# Patient Record
Sex: MALE | Race: WHITE | NOT HISPANIC OR LATINO | Employment: UNEMPLOYED | URBAN - METROPOLITAN AREA
[De-identification: names, ages, dates, MRNs, and addresses within clinical notes are randomized per-mention and may not be internally consistent; named-entity substitution may affect disease eponyms.]

---

## 2022-01-07 ENCOUNTER — TELEPHONE (OUTPATIENT)
Dept: FAMILY MEDICINE | Facility: CLINIC | Age: 32
End: 2022-01-07
Payer: COMMERCIAL

## 2022-01-10 NOTE — TELEPHONE ENCOUNTER
Future Appointments       Date Provider Specialty Appt Notes    1/18/2022 Kahlil Velázquez MD Family Medicine [facility]

## 2022-01-18 ENCOUNTER — TELEPHONE (OUTPATIENT)
Dept: FAMILY MEDICINE | Facility: CLINIC | Age: 32
End: 2022-01-18
Payer: COMMERCIAL

## 2022-01-18 ENCOUNTER — OFFICE VISIT (OUTPATIENT)
Dept: FAMILY MEDICINE | Facility: CLINIC | Age: 32
End: 2022-01-18
Payer: COMMERCIAL

## 2022-01-18 VITALS
OXYGEN SATURATION: 98 % | WEIGHT: 315 LBS | TEMPERATURE: 99 F | HEART RATE: 84 BPM | BODY MASS INDEX: 38.36 KG/M2 | DIASTOLIC BLOOD PRESSURE: 78 MMHG | HEIGHT: 76 IN | SYSTOLIC BLOOD PRESSURE: 112 MMHG

## 2022-01-18 DIAGNOSIS — Z00.00 WELL ADULT EXAM: Primary | ICD-10-CM

## 2022-01-18 DIAGNOSIS — E66.2 CLASS 2 OBESITY WITH ALVEOLAR HYPOVENTILATION, SERIOUS COMORBIDITY, AND BODY MASS INDEX (BMI) OF 39.0 TO 39.9 IN ADULT: ICD-10-CM

## 2022-01-18 DIAGNOSIS — M51.36 DDD (DEGENERATIVE DISC DISEASE), LUMBAR: ICD-10-CM

## 2022-01-18 DIAGNOSIS — Z12.5 ENCOUNTER FOR PROSTATE CANCER SCREENING: ICD-10-CM

## 2022-01-18 DIAGNOSIS — Z79.899 ENCOUNTER FOR LONG-TERM (CURRENT) USE OF MEDICATIONS: ICD-10-CM

## 2022-01-18 DIAGNOSIS — Z13.6 SCREENING FOR CARDIOVASCULAR CONDITION: ICD-10-CM

## 2022-01-18 DIAGNOSIS — Z13.220 ENCOUNTER FOR LIPID SCREENING FOR CARDIOVASCULAR DISEASE: ICD-10-CM

## 2022-01-18 DIAGNOSIS — Z13.6 ENCOUNTER FOR LIPID SCREENING FOR CARDIOVASCULAR DISEASE: ICD-10-CM

## 2022-01-18 PROBLEM — E66.812 CLASS 2 OBESITY WITH ALVEOLAR HYPOVENTILATION, SERIOUS COMORBIDITY, AND BODY MASS INDEX (BMI) OF 39.0 TO 39.9 IN ADULT: Status: ACTIVE | Noted: 2022-01-18

## 2022-01-18 PROBLEM — M51.369 DDD (DEGENERATIVE DISC DISEASE), LUMBAR: Status: ACTIVE | Noted: 2022-01-18

## 2022-01-18 PROCEDURE — 99999 PR PBB SHADOW E&M-EST. PATIENT-LVL IV: ICD-10-PCS | Mod: PBBFAC,,, | Performed by: FAMILY MEDICINE

## 2022-01-18 PROCEDURE — 3078F PR MOST RECENT DIASTOLIC BLOOD PRESSURE < 80 MM HG: ICD-10-PCS | Mod: CPTII,S$GLB,, | Performed by: FAMILY MEDICINE

## 2022-01-18 PROCEDURE — 1160F RVW MEDS BY RX/DR IN RCRD: CPT | Mod: CPTII,S$GLB,, | Performed by: FAMILY MEDICINE

## 2022-01-18 PROCEDURE — 3008F BODY MASS INDEX DOCD: CPT | Mod: CPTII,S$GLB,, | Performed by: FAMILY MEDICINE

## 2022-01-18 PROCEDURE — 3078F DIAST BP <80 MM HG: CPT | Mod: CPTII,S$GLB,, | Performed by: FAMILY MEDICINE

## 2022-01-18 PROCEDURE — 93005 EKG 12-LEAD: ICD-10-PCS | Mod: S$GLB,,, | Performed by: FAMILY MEDICINE

## 2022-01-18 PROCEDURE — 93005 ELECTROCARDIOGRAM TRACING: CPT | Mod: S$GLB,,, | Performed by: FAMILY MEDICINE

## 2022-01-18 PROCEDURE — 3074F SYST BP LT 130 MM HG: CPT | Mod: CPTII,S$GLB,, | Performed by: FAMILY MEDICINE

## 2022-01-18 PROCEDURE — 99999 PR PBB SHADOW E&M-EST. PATIENT-LVL IV: CPT | Mod: PBBFAC,,, | Performed by: FAMILY MEDICINE

## 2022-01-18 PROCEDURE — 93010 EKG 12-LEAD: ICD-10-PCS | Mod: S$GLB,,, | Performed by: INTERNAL MEDICINE

## 2022-01-18 PROCEDURE — 93010 ELECTROCARDIOGRAM REPORT: CPT | Mod: S$GLB,,, | Performed by: INTERNAL MEDICINE

## 2022-01-18 PROCEDURE — 1160F PR REVIEW ALL MEDS BY PRESCRIBER/CLIN PHARMACIST DOCUMENTED: ICD-10-PCS | Mod: CPTII,S$GLB,, | Performed by: FAMILY MEDICINE

## 2022-01-18 PROCEDURE — 1159F PR MEDICATION LIST DOCUMENTED IN MEDICAL RECORD: ICD-10-PCS | Mod: CPTII,S$GLB,, | Performed by: FAMILY MEDICINE

## 2022-01-18 PROCEDURE — 99385 PREV VISIT NEW AGE 18-39: CPT | Mod: S$GLB,,, | Performed by: FAMILY MEDICINE

## 2022-01-18 PROCEDURE — 99385 PR PREVENTIVE VISIT,NEW,18-39: ICD-10-PCS | Mod: S$GLB,,, | Performed by: FAMILY MEDICINE

## 2022-01-18 PROCEDURE — 3008F PR BODY MASS INDEX (BMI) DOCUMENTED: ICD-10-PCS | Mod: CPTII,S$GLB,, | Performed by: FAMILY MEDICINE

## 2022-01-18 PROCEDURE — 3074F PR MOST RECENT SYSTOLIC BLOOD PRESSURE < 130 MM HG: ICD-10-PCS | Mod: CPTII,S$GLB,, | Performed by: FAMILY MEDICINE

## 2022-01-18 PROCEDURE — 1159F MED LIST DOCD IN RCRD: CPT | Mod: CPTII,S$GLB,, | Performed by: FAMILY MEDICINE

## 2022-01-18 RX ORDER — TRAMADOL HYDROCHLORIDE 50 MG/1
50 TABLET ORAL EVERY 8 HOURS PRN
COMMUNITY
Start: 2022-01-05

## 2022-01-18 RX ORDER — BACLOFEN 10 MG/1
10 TABLET ORAL 3 TIMES DAILY
COMMUNITY
Start: 2022-01-05

## 2022-01-18 RX ORDER — MELOXICAM 7.5 MG/1
7.5 TABLET ORAL DAILY
COMMUNITY
Start: 2022-01-05

## 2022-01-18 NOTE — PATIENT INSTRUCTIONS
"Follow up in about 1 year (around 1/18/2023), or if symptoms worsen or fail to improve, for Annual Wellness Exam.     Patient Education       Prostate Cancer Screening (PSA Tests)   The Basics   Written by the doctors and editors at [facility]   What is prostate cancer screening? -- Prostate cancer screening is a way in which doctors check the prostate gland for signs of cancer. The prostate gland is part of the male anatomy. It sits below the bladder and in front of the rectum. It forms a ring around the urethra, the tube that carries urine out of the body (figure 1).  The main test used to screen for prostate cancer is a blood test called a "PSA test." Some people also have an exam called a rectal exam (figure 2).   Who should be screened for prostate cancer? -- Prostate cancer screening is done in people who have no symptoms of the disease. It is not clear whether getting screened for prostate cancer can extend life or prevent symptoms or problems. For this reason, doctors do not know who - if anyone - should be screened for prostate cancer.  Most experts recommend working with your doctor to decide whether screening is right for you. In most cases, this discussion should start around the age of 50. If you have any risk factors for prostate cancer, you might want to begin screening at age 40 to 45. Your risk is higher if you are black, have a father or brother with prostate cancer, or have certain genetic mutations such as "BRCA1" or "BRCA2."  Most doctors recommend against screening if you are age 70 or older, or have serious health problems.  Why do doctors offer screening? -- Doctors offer screening in the hopes of catching prostate cancer early - before it has a chance to grow, spread, or cause symptoms. With many cancers, catching the disease early is an important part of effective treatment. But prostate cancer is not like many other cancers. It usually grows slowly and does not usually lead to death. The " "problem is that a small number of prostate cancers are serious and can lead to death. Doctors do not have an ideal way to tell which prostate cancers are deadly and which ones would never cause any problems.  Certain tests can suggest which prostate cancers might be more likely to cause problems. But the tests are far from perfect. Also, different studies have come to different conclusions about the benefits of screening and whether or not it lowers the risk of dying from prostate cancer.  What are the drawbacks to getting screened? -- PSA tests have 2 main drawbacks:  · PSA tests sometimes give "false positives." This means they suggest cancer when there is actually no cancer. This can lead to unneeded worry and to further tests. One of these tests, a biopsy, can be briefly painful.  · When PSA tests lead to the discovery of cancer, there is very often no way to tell whether the cancer is one that could do harm. That means you could get treated for cancer that would never have done you any harm. That's a problem because treatment for prostate cancer has risks and often causes problems of its own. For instance, prostate cancer treatment can cause you to leak urine and to have problems with sex.  How do I decide if I should be screened? -- Work with your doctor or nurse to decide if screening is right for you. This will involve thinking about how likely it is that you will get prostate cancer. If you have a high risk of prostate cancer, screening might be a good idea.  You will also need to think about how you feel about the possible benefits and harms of being screened. Ask yourself:  · Do I want to know if I have prostate cancer, even if the cancer might never do me any harm?  · Would I be treated if I learned that I had prostate cancer?  · How do I feel about the risks of being treated for prostate cancer?  · How do I feel about the risks of getting a deadly or aggressive form of prostate cancer?  · Would I be " "willing to accept a high risk of side effects from treatment in return for a small chance of living longer?  What is a PSA test? -- PSA stands for "prostate-specific antigen." PSA is a protein made by the prostate. Levels of this protein usually go up when a person has prostate cancer. The protein also goes up for reasons that do not involve cancer. For example, PSA levels rise when a man:  · Has a condition called benign prostatic hyperplasia (BPH), sometimes called an enlarged prostate  · Has a prostate infection, also called prostatitis  · Hurts his prostate, for example while riding a bike  · Ejaculates (has an orgasm)  What if my PSA level is too high? -- If your PSA level is high, try not to panic. It's possible your PSA is high for reasons unrelated to cancer. If your PSA level is only somewhat high, often the next step is to have the PSA test again. For 2 days before the second test, avoid ejaculating and bike-riding. If your doctor thinks you have a prostate infection, you might also need to take antibiotics for a while before you repeat the test.  If your PSA is still high on the second test, or if it was very high the first time, you will probably need more testing. This might include a biopsy or other test. A biopsy means that a doctor will insert a needle into your prostate to take tiny samples of tissue. Those samples will then go to the lab to be checked for cancer.  If it turns out you do have cancer, remember that prostate cancer is not usually deadly. It usually grows slowly, so you probably have time to decide what to do. There are treatments that can sometimes cure prostate cancer. You might also choose to hold off on treatment and wait to see if your cancer shows signs of progressing.  How often should I be screened for prostate cancer? -- If you do decide to be screened, experts recommend repeating screening every 2 to 4 years.  Doctors recommend stopping screening when you turn 70 or if you " develop serious health problems. In these cases, the benefits of screening are not worth the possible harms.  All topics are updated as new evidence becomes available and our peer review process is complete.  This topic retrieved from [employer] on: Sep 21, 2021.  Topic 51467 Version 13.0  Release: 29.4.2 - C29.263  © 2021 UpToDate, Inc. and/or its affiliates. All rights reserved.  figure 1: Prostate gland     This drawing shows male internal organs and a close-up of the prostate gland.  Graphic 32711 Version 5.0    figure 2: Rectal exam     During a rectal exam, the doctor or nurse puts a finger inside your rectum and feels your prostate gland. That way he or she can see how big it is and whether it has bumps or dents or anything unusual.  Graphic 55880 Version 5.0    Consumer Information Use and Disclaimer   This information is not specific medical advice and does not replace information you receive from your health care provider. This is only a brief summary of general information. It does NOT include all information about conditions, illnesses, injuries, tests, procedures, treatments, therapies, discharge instructions or life-style choices that may apply to you. You must talk with your health care provider for complete information about your health and treatment options. This information should not be used to decide whether or not to accept your health care provider's advice, instructions or recommendations. Only your health care provider has the knowledge and training to provide advice that is right for you. The use of this information is governed by the [employer] End User License Agreement, available at https://www.[employer]/en/solutions/trivago/about/moises.The use of [employer] content is governed by the [employer] Terms of Use. ©2021 UpToDate, Inc. All rights reserved.  Copyright   © 2021 UpToDate, Inc. and/or its affiliates. All rights reserved.      Dear patient,   As a result of recent federal  legislation (The Federal Cures Act), you may receive lab or pathology results from your visit in your [facility] account before your physician is able to contact you. Your physician or their representative will relay the results to you with their recommendations at their soonest availability.     If no improvement in symptoms or symptoms worsen, please be advised to call MD, follow-up at clinic and/or go to ER if becomes severe.    Kahlil Velázquez M.D.        We Offer TELEHEALTH & Same Day Appointments!   Book your Telehealth appointment with me through my nurse or   Clinic appointments on [facility]!    [address] [place] [address]    Office: [phone]   FAX: [phone]    Check out my Facebook Page and Follow Me at: [email or web address]    Check out my website at [employer] by clicking on: [email or web address]    To Schedule appointments online, go to [facility]: [email or web address]

## 2022-01-18 NOTE — TELEPHONE ENCOUNTER
----- Message from Dayana Truong sent at 1/18/2022 10:39 AM CST -----  Contact: [facility] Physical Therapist Mr. Ramirez @[facility] Mobile # [phone]  Mr. Ramirez patients Physical Therapist is calling in regards to him wanting to let you know that the patient have complain to him while he was in therapy this morning that he felt his pulse in his stomach while he was in therapy.     Mr. Neff is a new patient who's coming in to see you on this morning for eleven.

## 2022-01-18 NOTE — PROGRESS NOTES
This note is specifically for wellness visit performed today.   WELLNESS EXAM    Patient ID: Tomas Neff is a 31 y.o. male.  has a past medical history of DDD (degenerative disc disease), lumbar (1/18/2022).   Chief Complaint:  Encounter for wellness exam    Well Adult Physical: Patient here for a comprehensive physical exam.The patient reports chronic problems.  New patient.  Patient is here to establish primary care.  He is under the care of pain management/orthopedic spine at [facility].  Currently undergoing physical therapy for lumbar degenerative disc disease.  He is on anti-inflammatory muscle relaxer as needed.  Tramadol as needed for severe pain.  he reports compliance.  No side effects reported.  Symptoms are improved with treatment plan.The patient was checked in the [facility] Board of Pharmacy's  Prescription Monitoring Program. There appears to be no incongruities with the patient's verbalized history.     I received a message from his physical therapist today reporting that the patient was stating that he felt his pulse in his upper abdomen after certain maneuvers.  No abdominal bruit on exam.  Patient reports that he is no longer having any abnormal sensation.  No blood pressure problems in the past or currently.  Nonsmoker.    EKG today screening is within normal limits.    Updating labs today.    Do you take any herbs or supplements that were not prescribed by a doctor? no   Are you taking calcium supplements? no    History:  No issues  Date last PSA: No results found for: PSA The natural history of prostate cancer and ongoing controversy regarding screening and potential treatment outcomes of prostate cancer has been discussed with the patient. The meaning of a false positive PSA and a false negative PSA has been discussed. He indicates understanding of the limitations of this screening test and wishes  to proceed with screening PSA testing.  No results found for: TESTOSTERONE No results  found for: TESTOSTERONE, TOTALTESTOST, BIOTESTO   Colon cancer screening:  Not indicated  The patient has no Health Maintenance topics of status Not Due   ==============================================  History reviewed.   Health Maintenance Due   Topic Date Due    Hepatitis C Screening  Never done    Lipid Panel  Never done    HIV Screening  Never done    TETANUS VACCINE  08/05/2014    COVID-19 Vaccine (2 - Pfizer 3-dose series) 08/14/2021    Influenza Vaccine (1) Never done       Past Medical History:  Past Medical History:   Diagnosis Date    DDD (degenerative disc disease), lumbar 1/18/2022    Impression:   1. Right subarticular/foraminal zone extrusion at L4-5 effacing nerve roots in the right lateral recess.  2. Mild lumbar degenerative changes elsewhere, with mild bilateral foraminal narrowing spanning L4-S1.      Electronically signed by Lyndon Shay MD on 1/17/2022 11:43 AM      Past Surgical History:   Procedure Laterality Date    KNEE ARTHROSCOPY       Review of patient's allergies indicates:  No Known Allergies  Current Outpatient Medications on File Prior to Visit   Medication Sig Dispense Refill    baclofen (LIORESAL) 10 MG tablet Take 10 mg by mouth 3 (three) times daily.      meloxicam (MOBIC) 7.5 MG tablet Take 7.5 mg by mouth once daily.      traMADoL (ULTRAM) 50 mg tablet Take 50 mg by mouth every 8 (eight) hours as needed.       No current facility-administered medications on file prior to visit.     Social History     Socioeconomic History    Marital status:    Tobacco Use    Smoking status: Never Smoker    Smokeless tobacco: Never Used   Substance and Sexual Activity    Alcohol use: Not Currently     Alcohol/week: 0.0 standard drinks    Drug use: Never    Sexual activity: Yes     Partners: Female     Birth control/protection: None     Family History   Problem Relation Age of Onset    Coronary artery disease Maternal Grandfather     Breast cancer Paternal Grandmother      Diverticulosis Mother        Review of Systems   Constitutional: Negative for activity change and unexpected weight change.   HENT: Negative for hearing loss, rhinorrhea and trouble swallowing.    Eyes: Negative for discharge and visual disturbance.   Respiratory: Negative for chest tightness and wheezing.    Cardiovascular: Negative for chest pain and palpitations.   Gastrointestinal: Negative for blood in stool, constipation, diarrhea and vomiting.   Endocrine: Negative for polydipsia and polyuria.   Genitourinary: Negative for difficulty urinating, hematuria and urgency.   Musculoskeletal: Positive for back pain. Negative for arthralgias, joint swelling and neck pain.   Neurological: Negative for weakness and headaches.   Psychiatric/Behavioral: Negative for confusion and dysphoric mood.        Objective:     Vitals:    01/18/22 1136   BP: 112/78   Pulse: 84   Temp: 98.5 °F (36.9 °C)    Body mass index is 39.07 kg/m².  Physical Exam  Vitals and nursing note reviewed.   Constitutional:       General: He is not in acute distress.     Appearance: He is well-developed and well-nourished. He is not diaphoretic.   HENT:      Head: Normocephalic and atraumatic.      Right Ear: External ear normal.      Left Ear: External ear normal.      Nose: Nose normal. No rhinorrhea.   Eyes:      Extraocular Movements: Extraocular movements intact and EOM normal.      Pupils: Pupils are equal, round, and reactive to light.   Neck:      Vascular: No carotid bruit.   Cardiovascular:      Rate and Rhythm: Normal rate.      Pulses: Normal pulses.      Heart sounds: No murmur heard.      Pulmonary:      Effort: Pulmonary effort is normal. No respiratory distress.      Breath sounds: Normal breath sounds.   Abdominal:      General: Bowel sounds are normal. There is no abdominal bruit.      Palpations: Abdomen is soft.   Musculoskeletal:         General: Tenderness present. Normal range of motion.      Cervical back: Normal range of  motion and neck supple.   Skin:     General: Skin is warm and dry.      Capillary Refill: Capillary refill takes less than 2 seconds.      Findings: No rash.   Neurological:      General: No focal deficit present.      Mental Status: He is alert and oriented to person, place, and time.   Psychiatric:         Attention and Perception: He is attentive.         Mood and Affect: Mood and affect and mood normal. Mood is not anxious or depressed. Affect is not labile, blunt, angry or inappropriate.         Speech: He is communicative. Speech is not rapid and pressured, delayed, slurred or tangential.         Behavior: Behavior normal. Behavior is not agitated, slowed, aggressive, withdrawn, hyperactive, combative or actively hallucinating.         Thought Content: Thought content normal. Thought content is not paranoid or delusional. Thought content does not include homicidal or suicidal ideation. Thought content does not include homicidal or suicidal plan.         Cognition and Memory: Cognition and memory normal. Memory is not impaired.         Judgment: Judgment normal. Judgment is not impulsive or inappropriate.          No results found for any previous visit.        Assessment / Plan:    1.  Routine health exam-patient here for annual wellness exam.  Labs ordered.  Health maintenance was reviewed and ordered.  Anticipatory guidance: Don't smoke.  Healthy diet and regular exercise recommended. Vaccine recommendations discussed.  See orders.  Reviewed Anticipatory guidance, risk factor reduction interventions or counseling as needed, Complete history , physical was completed today.  Complete and thorough medication reconciliation was performed.  Discussed risks and benefits of medications.  Advised patient on orders and health maintenance.  We discussed old records and old labs if available.  Will request any records not available through epic.  Continue current medications listed on your summary sheet.  All questions  were answered. Patient had no further concerns. Advised of diagnoses and plan. Follow up as planned or return sooner if symptoms persist or worsen.   Lumbar degenerative disc disease:  Continue with physical therapy continue follow-up with pain management and continue current medication regimen.  Follow-up sooner if no improvement.  Reviewed imaging from [facility].    Orders Placed This Encounter   Procedures    CBC Without Differential     Standing Status:   Future     Number of Occurrences:   1     Standing Expiration Date:   3/19/2023    Comprehensive Metabolic Panel     Standing Status:   Future     Number of Occurrences:   1     Standing Expiration Date:   3/19/2023    TSH     Standing Status:   Future     Number of Occurrences:   1     Standing Expiration Date:   3/19/2023    Hemoglobin A1C     Standing Status:   Future     Number of Occurrences:   1     Standing Expiration Date:   3/19/2023    Lipid Panel     Standing Status:   Future     Number of Occurrences:   1     Standing Expiration Date:   3/19/2023    PSA, Screening     Standing Status:   Future     Number of Occurrences:   1     Standing Expiration Date:   3/19/2023    IN OFFICE EKG 12-LEAD (to [place])     Standing Status:   Future     Standing Expiration Date:   1/18/2023     Order Specific Question:   Diagnosis     Answer:   Encounter for screening for cardiovascular disorders [[id number]]           Future Appointments     Date Provider Specialty Appt Notes    1/18/2023 Kahlil Velázquez MD Family Medicine annual         Kahlil Velázquez MD

## 2022-05-31 ENCOUNTER — PATIENT MESSAGE (OUTPATIENT)
Dept: FAMILY MEDICINE | Facility: CLINIC | Age: 32
End: 2022-05-31
Payer: COMMERCIAL

## 2023-02-20 ENCOUNTER — TELEPHONE (OUTPATIENT)
Dept: FAMILY MEDICINE | Facility: CLINIC | Age: 33
End: 2023-02-20

## 2023-02-21 ENCOUNTER — TELEPHONE (OUTPATIENT)
Dept: FAMILY MEDICINE | Facility: CLINIC | Age: 33
End: 2023-02-21

## 2023-02-21 NOTE — TELEPHONE ENCOUNTER
----- Message from Parviz Andrews sent at 2/21/2023  9:25 AM CST -----  Contact: Julisa  Type:  Patient Returning Call    Who Called:Tomas   Who Left Message for Patient:Mabel  Does the patient know what this is regarding?:annual   Would the patient rather a call back or a response via [facility]? Call back   Best Call Back Number:[phone]  Additional Information:        Thanks  CF

## 2023-02-28 ENCOUNTER — OFFICE VISIT (OUTPATIENT)
Dept: FAMILY MEDICINE | Facility: CLINIC | Age: 33
End: 2023-02-28

## 2023-02-28 DIAGNOSIS — Z79.899 ENCOUNTER FOR LONG-TERM (CURRENT) USE OF MEDICATIONS: ICD-10-CM

## 2023-02-28 DIAGNOSIS — Z13.6 ENCOUNTER FOR LIPID SCREENING FOR CARDIOVASCULAR DISEASE: ICD-10-CM

## 2023-02-28 DIAGNOSIS — M54.9 UPPER BACK PAIN: ICD-10-CM

## 2023-02-28 DIAGNOSIS — E66.2 CLASS 2 OBESITY WITH ALVEOLAR HYPOVENTILATION, SERIOUS COMORBIDITY, AND BODY MASS INDEX (BMI) OF 39.0 TO 39.9 IN ADULT: ICD-10-CM

## 2023-02-28 DIAGNOSIS — M51.36 DDD (DEGENERATIVE DISC DISEASE), LUMBAR: ICD-10-CM

## 2023-02-28 DIAGNOSIS — Z13.220 ENCOUNTER FOR LIPID SCREENING FOR CARDIOVASCULAR DISEASE: ICD-10-CM

## 2023-02-28 DIAGNOSIS — Z00.00 WELL ADULT EXAM: Primary | ICD-10-CM

## 2023-02-28 PROCEDURE — 99214 OFFICE O/P EST MOD 30 MIN: CPT | Mod: PBBFAC,PO | Performed by: FAMILY MEDICINE

## 2023-02-28 PROCEDURE — 99395 PR PREVENTIVE VISIT,EST,18-39: ICD-10-PCS | Mod: S$PBB,,, | Performed by: FAMILY MEDICINE

## 2023-02-28 PROCEDURE — 99999 PR PBB SHADOW E&M-EST. PATIENT-LVL IV: CPT | Mod: PBBFAC,,, | Performed by: FAMILY MEDICINE

## 2023-02-28 PROCEDURE — 99395 PREV VISIT EST AGE 18-39: CPT | Mod: S$PBB,,, | Performed by: FAMILY MEDICINE

## 2023-02-28 PROCEDURE — 99999 PR PBB SHADOW E&M-EST. PATIENT-LVL IV: ICD-10-PCS | Mod: PBBFAC,,, | Performed by: FAMILY MEDICINE

## 2023-02-28 NOTE — Clinical Note
Please send patient's lab orders to [facility].  Notify him when this has been done.  Patient has [facility] insurance and would like to go there for labs.  Check with him to see if he needs anything further.

## 2023-03-01 VITALS
DIASTOLIC BLOOD PRESSURE: 68 MMHG | BODY MASS INDEX: 38.36 KG/M2 | HEART RATE: 70 BPM | HEIGHT: 76 IN | WEIGHT: 315 LBS | SYSTOLIC BLOOD PRESSURE: 124 MMHG | TEMPERATURE: 98 F | OXYGEN SATURATION: 98 % | RESPIRATION RATE: 16 BRPM

## 2023-03-02 ENCOUNTER — PATIENT MESSAGE (OUTPATIENT)
Dept: FAMILY MEDICINE | Facility: CLINIC | Age: 33
End: 2023-03-02

## 2023-03-02 PROBLEM — M54.9 UPPER BACK PAIN: Status: ACTIVE | Noted: 2023-03-02

## 2023-03-02 NOTE — PROGRESS NOTES
This note is specifically for wellness visit performed today.   WELLNESS EXAM    Patient ID: Tomas Neff is a 32 y.o. male.  has a past medical history of DDD (degenerative disc disease), lumbar (1/18/2022).   Chief Complaint:  Encounter for wellness exam    Well Adult Physical: Patient here for a comprehensive physical exam.The patient reports chronic problems.  The patient's last visit with me was on 1/18/2022.  Reviewed Care team:orthopedics: Jose Alberto Arana     March 2023:  Patient states that he feels fine except for upper neck and upper back pain.  Patient reports it is positional worse when sleeping at night.  No recent injury or trauma.    New patient.  Patient is here to establish primary care.  He is under the care of pain management/orthopedic spine at [facility].  Currently undergoing physical therapy for lumbar degenerative disc disease.  He is on anti-inflammatory muscle relaxer as needed.  Tramadol as needed for severe pain.  he reports compliance.  No side effects reported.  Symptoms are improved with treatment plan.The patient was checked in the [facility] [employer]'s  Prescription Monitoring Program. There appears to be no incongruities with the patient's verbalized history.     I received a message from his physical therapist today reporting that the patient was stating that he felt his pulse in his upper abdomen after certain maneuvers.  No abdominal bruit on exam.  Patient reports that he is no longer having any abnormal sensation.  No blood pressure problems in the past or currently.  Nonsmoker.    EKG today screening is within normal limits.    Updating labs today.    Lab Results   Component Value Date    CHOL 210 (H) 01/19/2022     Lab Results   Component Value Date    HDL 31 01/19/2022     Lab Results   Component Value Date    LDLCALC 154 (H) 01/19/2022     Lab Results   Component Value Date    TRIG 126 01/19/2022     Lab Results   Component Value Date    CHOLHDL 6.8 (H) 01/19/2022      CHRONIC. Stable. Compliant with medications for managed conditions. See medication list. No SE reported.   Routine lab analysis is being monitored. Refills were addressed.  No results found for: WBC, HGB, HCT, MCV, PLT      Chemistry    No results found for: NA, K, CL, CO2, BUN, CREATININE, GLU No results found for: CALCIUM, ALKPHOS, AST, ALT, BILITOT, ESTGFRAFRICA, EGFRNONAA       No results found for: TSH, Z2MTMIH, S3PAING, THYROIDAB, FREET4, T3FREE  Lab Results   Component Value Date    HGBA1C 5.6 01/19/2022         Do you take any herbs or supplements that were not prescribed by a doctor? no   Are you taking calcium supplements? no    History:  No issues  Date last PSA: No results found for: PSA no family history of prostate cancer reported.  The natural history of prostate cancer and ongoing controversy regarding screening and potential treatment outcomes of prostate cancer has been discussed with the patient. The meaning of a false positive PSA and a false negative PSA has been discussed. He indicates understanding of the limitations of this screening test and wishes  to proceed with screening PSA testing.  No results found for: TESTOSTERONE No results found for: TESTOSTERONE, TOTALTESTOST, BIOTESTO   Colon cancer screening:  Not indicated, no family history of colon cancer reported  The patient has no Health Maintenance topics of status Not Due   ==============================================  History reviewed.   Health Maintenance Due   Topic Date Due    Hepatitis C Screening  Never done    HIV Screening  Never done    TETANUS VACCINE  08/05/2014    COVID-19 Vaccine (2 - Pfizer series) 08/14/2021    Influenza Vaccine (1) Never done       Past Medical History:  Past Medical History:   Diagnosis Date    DDD (degenerative disc disease), lumbar 1/18/2022    Impression:   1. Right subarticular/foraminal zone extrusion at L4-5 effacing nerve roots in the right lateral recess.  2. Mild lumbar degenerative  changes elsewhere, with mild bilateral foraminal narrowing spanning L4-S1.      Electronically signed by Lyndon Shay MD on 1/17/2022 11:43 AM      Past Surgical History:   Procedure Laterality Date    KNEE ARTHROSCOPY       Review of patient's allergies indicates:  No Known Allergies  Current Outpatient Medications on File Prior to Visit   Medication Sig Dispense Refill    baclofen (LIORESAL) 10 MG tablet Take 10 mg by mouth 3 (three) times daily.      meloxicam (MOBIC) 7.5 MG tablet Take 7.5 mg by mouth once daily.      traMADoL (ULTRAM) 50 mg tablet Take 50 mg by mouth every 8 (eight) hours as needed.       No current facility-administered medications on file prior to visit.     Social History     Socioeconomic History    Marital status:    Tobacco Use    Smoking status: Never    Smokeless tobacco: Never   Substance and Sexual Activity    Alcohol use: Not Currently     Alcohol/week: 0.0 standard drinks    Drug use: Never    Sexual activity: Yes     Partners: Female     Birth control/protection: None     Family History   Problem Relation Age of Onset    Coronary artery disease Maternal Grandfather     Breast cancer Paternal Grandmother     Diverticulosis Mother        Review of Systems   Constitutional:  Negative for activity change and unexpected weight change.   HENT:  Negative for hearing loss, rhinorrhea and trouble swallowing.    Eyes:  Negative for discharge and visual disturbance.   Respiratory:  Negative for chest tightness and wheezing.    Cardiovascular:  Negative for chest pain and palpitations.   Gastrointestinal:  Negative for blood in stool, constipation, diarrhea and vomiting.   Endocrine: Negative for polydipsia and polyuria.   Genitourinary:  Negative for difficulty urinating, hematuria and urgency.   Musculoskeletal:  Positive for back pain and neck pain. Negative for arthralgias and joint swelling.   Neurological:  Negative for weakness and headaches.   Psychiatric/Behavioral:  Negative  for confusion and dysphoric mood.       Objective:     Vitals:    02/28/23 0757   BP: 124/68   Pulse: 70   Resp: 16   Temp: 98.2 °F (36.8 °C)    Body mass index is 39.09 kg/m².  Physical Exam  Vitals and nursing note reviewed.   Constitutional:       General: He is not in acute distress.     Appearance: He is well-developed. He is not diaphoretic.   HENT:      Head: Normocephalic and atraumatic.      Right Ear: External ear normal.      Left Ear: External ear normal.      Nose: Nose normal. No rhinorrhea.   Eyes:      Extraocular Movements: Extraocular movements intact.      Pupils: Pupils are equal, round, and reactive to light.   Neck:      Vascular: No carotid bruit.   Cardiovascular:      Rate and Rhythm: Normal rate.      Pulses: Normal pulses.      Heart sounds: No murmur heard.  Pulmonary:      Effort: Pulmonary effort is normal. No respiratory distress.      Breath sounds: Normal breath sounds.   Abdominal:      General: Bowel sounds are normal. There is no abdominal bruit.      Palpations: Abdomen is soft.   Musculoskeletal:         General: Tenderness present. Normal range of motion.      Cervical back: Normal range of motion and neck supple. Spasms and tenderness present. No bony tenderness or crepitus. No pain with movement.      Lumbar back: Spasms, tenderness and bony tenderness present. Negative right straight leg raise test and negative left straight leg raise test.   Skin:     General: Skin is warm and dry.      Capillary Refill: Capillary refill takes less than 2 seconds.      Findings: No rash.   Neurological:      General: No focal deficit present.      Mental Status: He is alert and oriented to person, place, and time.   Psychiatric:         Attention and Perception: He is attentive.         Mood and Affect: Mood normal. Mood is not anxious or depressed. Affect is not labile, blunt, angry or inappropriate.         Speech: He is communicative. Speech is not rapid and pressured, delayed, slurred  or tangential.         Behavior: Behavior normal. Behavior is not agitated, slowed, aggressive, withdrawn, hyperactive or combative.         Thought Content: Thought content normal. Thought content is not paranoid or delusional. Thought content does not include homicidal or suicidal ideation. Thought content does not include homicidal or suicidal plan.         Cognition and Memory: Memory is not impaired.         Judgment: Judgment normal. Judgment is not impulsive or inappropriate.        No results found for any previous visit.    REASON FOR EXAM: [M54.2]-Cervicalgia     TECHNICAL FACTORS: Two or three views     COMPARISON: None     FINDINGS: There is no evidence of acute fracture. There is no evidence of subluxation. Vertebral body heights and disc spaces are maintained. Prevertebral soft tissue is within normal limits. The cervical spine maintains a normal lordotic curvature.     IMPRESSION:   No evidence of acute osseous abnormality.     Electronically signed by Lisette Huntley MD on 3/3/2022 8:56 AM    Indication: [M54.16]-Radiculopathy, lumbar region / [M47.27]-Other spondylosis with radiculopathy, lumbosacral region / Low back pain, symptoms persist with > 6wks conservative treatment / Low back pain, progressive neurologic deficit     Technique: Multiplanar T1 and T2 weighted images were obtained through the lumbar spine without administration of intravenous contrast.     Comparison: None     Findings:   Lumbar vertebral body heights and alignment are maintained. There is straightening of the normal lordosis. There is no suspicious marrow signal. The conus appears normal.     T12-L1: There is disc desiccation and mild facet DJD. There is no spinal stenosis. There is no foraminal narrowing.     L1-L2: There is disc desiccation and diffuse disc bulge without spinal stenosis. There is mild facet DJD without foraminal narrowing.     L2-L3: There is no spinal stenosis or foraminal narrowing. There is mild  facet DJD.     L3-L4: There is disc desiccation and mild diffuse disc bulge without spinal stenosis. There is mild facet DJD without foraminal narrowing.     L4-L5: There is disc desiccation and diffuse disc bulge with a right subarticular/foraminal zone extrusion. This extends cranially along the posterior margin of the L4 vertebral body. This contacts traversing nerve roots in the right lateral recess.   There is no spinal stenosis. There is moderate facet DJD resulting in mild bilateral foraminal narrowing.     L5-S1: There is disc desiccation and diffuse disc bulge with no spinal stenosis. There is moderate facet DJD with minimal/mild bilateral foraminal narrowing.     Impression:     1. Right subarticular/foraminal zone extrusion at L4-5 effacing nerve roots in the right lateral recess.   2. Mild lumbar degenerative changes elsewhere, with mild bilateral foraminal narrowing spanning L4-S1.           Electronically signed by Lyndon Shay MD on 1/17/2022 11:43 AM    Indication: [M54.50]-Low back pain, unspecified     3 views     Comparison: None     Impression:   Lumbar vertebral body heights are maintained. There is mild discogenic degenerative change as well as moderate SI joint DJD. There is moderate facet DJD, most severe at L4-5 and L5-S1.     Electronically signed by Lyndon Shay MD on 1/5/2022 11:50 AM    Assessment / Plan:    1.  Routine health exam-patient here for annual wellness exam.  Labs ordered.  Health maintenance was reviewed and ordered.  Anticipatory guidance: Don't smoke.  Healthy diet and regular exercise recommended. Vaccine recommendations discussed.  See orders.  Reviewed Anticipatory guidance, risk factor reduction interventions or counseling as needed, Complete history , physical was completed today.  Complete and thorough medication reconciliation was performed.  Discussed risks and benefits of medications.  Advised patient on orders and health maintenance.  We discussed old records and  old labs if available.  Will request any records not available through epic.  Continue current medications listed on your summary sheet.  All questions were answered. Patient had no further concerns. Advised of diagnoses and plan. Follow up as planned or return sooner if symptoms persist or worsen.   Lumbar degenerative disc disease:  Continue with physical therapy continue follow-up with pain management and continue current medication regimen.  Follow-up sooner if no improvement.  Reviewed imaging from [facility].  Upper back pain/Neck spasm:  Referral to physical therapy.  Patient would benefit from dry needling.  Set up x-ray of the cervical spine if no improvement.  Discussed exercise and stretching for low back and upper back pain.  Orders Placed This Encounter   Procedures    CBC Without Differential     Standing Status:   Future     Standing Expiration Date:   4/30/2024    Comprehensive Metabolic Panel     Standing Status:   Future     Standing Expiration Date:   4/30/2024    TSH     Standing Status:   Future     Standing Expiration Date:   4/30/2024    Hemoglobin A1C     Standing Status:   Future     Standing Expiration Date:   4/30/2024    Lipid Panel     Standing Status:   Future     Standing Expiration Date:   4/30/2024    Urinalysis     Standing Status:   Future     Standing Expiration Date:   4/30/2024     Order Specific Question:   Collection Type     Answer:   Urine, Clean Catch                Kahlil Velázquez MD

## 2023-03-02 NOTE — PATIENT INSTRUCTIONS
Follow up if symptoms worsen or fail to improve, for Annual Wellness Exam.     Dear patient,   As a result of recent federal legislation (The Federal Cures Act), you may receive lab or pathology results from your visit in your [facility] account before your physician is able to contact you. Your physician or their representative will relay the results to you with their recommendations at their soonest availability.     If no improvement in symptoms or symptoms worsen, please be advised to call MD, follow-up at clinic and/or go to ER if becomes severe.    Kahlil Velázquez M.D.        We Offer TELEHEALTH & Same Day Appointments!   Book your Telehealth appointment with me through my nurse or   Clinic appointments on cookdinner!    [address] [place] [address]    Office: [phone]   FAX: [phone]    Check out my Facebook Page and Follow Me at: [email or web address]    Check out my website at [employer] by clicking on: [email or web address]    To Schedule appointments online, go to [employer]: [email or web address]

## 2023-03-04 ENCOUNTER — PATIENT MESSAGE (OUTPATIENT)
Dept: FAMILY MEDICINE | Facility: CLINIC | Age: 33
End: 2023-03-04

## 2023-04-05 ENCOUNTER — PATIENT MESSAGE (OUTPATIENT)
Dept: FAMILY MEDICINE | Facility: CLINIC | Age: 33
End: 2023-04-05

## 2023-04-06 NOTE — TELEPHONE ENCOUNTER
Cholesterol levels have improved from previous.  A1c has improved significantly from previous.  No diabetes.  Thyroid test is normal.  Metabolic panel shows normal kidney function liver function and electrolytes.  Blood counts are normal.  Urinalysis is within normal limits.  Increase hydration with water.

## 2024-05-18 ENCOUNTER — PATIENT MESSAGE (OUTPATIENT)
Dept: FAMILY MEDICINE | Facility: CLINIC | Age: 34
End: 2024-05-18

## 2024-06-05 ENCOUNTER — OFFICE VISIT (OUTPATIENT)
Dept: FAMILY MEDICINE | Facility: CLINIC | Age: 34
End: 2024-06-05

## 2024-06-05 VITALS
OXYGEN SATURATION: 98 % | HEART RATE: 74 BPM | WEIGHT: 315 LBS | DIASTOLIC BLOOD PRESSURE: 78 MMHG | SYSTOLIC BLOOD PRESSURE: 122 MMHG | HEIGHT: 75 IN | BODY MASS INDEX: 39.17 KG/M2

## 2024-06-05 DIAGNOSIS — E66.2 CLASS 3 OBESITY WITH ALVEOLAR HYPOVENTILATION, SERIOUS COMORBIDITY, AND BODY MASS INDEX (BMI) OF 40.0 TO 44.9 IN ADULT: ICD-10-CM

## 2024-06-05 DIAGNOSIS — Z00.00 WELL ADULT EXAM: Primary | ICD-10-CM

## 2024-06-05 DIAGNOSIS — Z13.6 ENCOUNTER FOR LIPID SCREENING FOR CARDIOVASCULAR DISEASE: ICD-10-CM

## 2024-06-05 DIAGNOSIS — M51.36 DDD (DEGENERATIVE DISC DISEASE), LUMBAR: ICD-10-CM

## 2024-06-05 DIAGNOSIS — Z13.220 ENCOUNTER FOR LIPID SCREENING FOR CARDIOVASCULAR DISEASE: ICD-10-CM

## 2024-06-05 DIAGNOSIS — Z79.899 ENCOUNTER FOR LONG-TERM (CURRENT) USE OF MEDICATIONS: ICD-10-CM

## 2024-06-05 PROBLEM — M51.369 DDD (DEGENERATIVE DISC DISEASE), LUMBAR: Chronic | Status: ACTIVE | Noted: 2022-01-18

## 2024-06-05 PROCEDURE — 99213 OFFICE O/P EST LOW 20 MIN: CPT | Mod: PBBFAC,PO | Performed by: FAMILY MEDICINE

## 2024-06-05 PROCEDURE — 99999 PR PBB SHADOW E&M-EST. PATIENT-LVL III: CPT | Mod: PBBFAC,,, | Performed by: FAMILY MEDICINE

## 2024-06-05 PROCEDURE — 99395 PREV VISIT EST AGE 18-39: CPT | Mod: S$PBB,,, | Performed by: FAMILY MEDICINE

## 2024-06-05 NOTE — PROGRESS NOTES
This note is specifically for wellness visit performed today.   WELLNESS EXAM    Patient ID: Tomas Neff is a 34 y.o. male.  has a past medical history of DDD (degenerative disc disease), lumbar (1/18/2022).   Chief Complaint:  Encounter for wellness exam    Well Adult Physical: Patient here for a comprehensive physical exam.The patient reports chronic problems.  The patient's last visit with me was on 2/28/2023.    June 2024:  History of Present Illness  The patient is a 34-year-old male here for follow-up annual exam.    The patient reports a slight weight gain since his last visit, however, no significant changes have been observed. He experiences intermittent back pain, for which he is under the care of Dr. Kevin Arana, an orthopedist PA. He is currently on a regimen of baclofen and meloxicam, which he does not take daily. Imaging revealed a herniated disc. He has previously undergone acupuncture and physical therapy, the latter of which he is uncertain of their efficacy. The pain originates from his neck and extends down his right leg. He speculates that his symptoms may be related to his past football participation. The initial incident of back pain occurred around the age of 22 or 23, with episodes occurring every few years. In 08/2021, he was in [place], during which he strained his back while working, which persisted for approximately 6 months. Subsequent to this, he experienced numbness in his leg, leading to a sensation of instability in his leg.       Reviewed Care team:orthopedics: Jose Alberto Arana     March 2023:  Patient states that he feels fine except for upper neck and upper back pain.  Patient reports it is positional worse when sleeping at night.  No recent injury or trauma.    New patient.  Patient is here to establish primary care.  He is under the care of pain management/orthopedic spine at [place].  Currently undergoing physical therapy for lumbar degenerative disc disease.  He is on  "anti-inflammatory muscle relaxer as needed.  Tramadol as needed for severe pain.  he reports compliance.  No side effects reported.  Symptoms are improved with treatment plan.The patient was checked in the [facility] [employer] [facility] [employer]'s  Prescription Monitoring Program. There appears to be no incongruities with the patient's verbalized history.     I received a message from his physical therapist today reporting that the patient was stating that he felt his pulse in his upper abdomen after certain maneuvers.  No abdominal bruit on exam.  Patient reports that he is no longer having any abnormal sensation.  No blood pressure problems in the past or currently.  Nonsmoker.    EKG today screening is within normal limits.    Updating labs today.    Lab Results   Component Value Date    CHOL 187 03/30/2023    CHOL 210 (H) 01/19/2022     Lab Results   Component Value Date    HDL 33 03/30/2023    HDL 31 01/19/2022     Lab Results   Component Value Date    LDLCALC 132 (H) 03/30/2023    LDLCALC 154 (H) 01/19/2022     Lab Results   Component Value Date    TRIG 109 03/30/2023    TRIG 126 01/19/2022     Lab Results   Component Value Date    CHOLHDL 5.7 (H) 03/30/2023    CHOLHDL 6.8 (H) 01/19/2022     CHRONIC. Stable. Compliant with medications for managed conditions. See medication list. No SE reported.   Routine lab analysis is being monitored. Refills were addressed.  No results found for: "WBC", "HGB", "HCT", "MCV", "PLT"      Chemistry        Component Value Date/Time     03/30/2023 0736    K 4.1 03/30/2023 0736    CO2 27 03/30/2023 0736    BUN 12 03/30/2023 0736    CREATININE 0.88 (L) 03/30/2023 0736        Component Value Date/Time    CALCIUM 9.4 03/30/2023 0736    ALKPHOS 53 03/30/2023 0736    AST 20 03/30/2023 0736    ALT 20 03/30/2023 0736    BILITOT 0.8 03/30/2023 0736          No results found for: "TSH", "M0HOXKN", "D8PTAFK", "THYROIDAB", "FREET4", "T3FREE"  Lab Results   Component Value Date    HGBA1C 5.0 " "03/30/2023         Do you take any herbs or supplements that were not prescribed by a doctor? no   Are you taking calcium supplements? no    History:  No issues  Date last PSA: No results found for: "PSA" no family history of prostate cancer reported.  The natural history of prostate cancer and ongoing controversy regarding screening and potential treatment outcomes of prostate cancer has been discussed with the patient. The meaning of a false positive PSA and a false negative PSA has been discussed. He indicates understanding of the limitations of this screening test and wishes  to proceed with screening PSA testing.  No results found for: "TESTOSTERONE" No results found for: "TESTOSTERONE", "TOTALTESTOST", "BIOTESTO"   Colon cancer screening:  Not indicated, no family history of colon cancer reported  Health Maintenance Topics with due status: Not Due       Topic Last Completion Date    Hemoglobin A1c (Diabetic Prevention Screening) 03/30/2023    Lipid Panel 03/30/2023    TETANUS VACCINE 10/26/2023    Influenza Vaccine Not Due      ==============================================  History reviewed.   There are no preventive care reminders to display for this patient.      Past Medical History:  Past Medical History:   Diagnosis Date    DDD (degenerative disc disease), lumbar 1/18/2022    Impression:   1. Right subarticular/foraminal zone extrusion at L4-5 effacing nerve roots in the right lateral recess.  2. Mild lumbar degenerative changes elsewhere, with mild bilateral foraminal narrowing spanning L4-S1.      Electronically signed by Lyndon Shay MD on 1/17/2022 11:43 AM      Past Surgical History:   Procedure Laterality Date    KNEE ARTHROSCOPY       Review of patient's allergies indicates:  No Known Allergies  Current Outpatient Medications on File Prior to Visit   Medication Sig Dispense Refill    baclofen (LIORESAL) 10 MG tablet Take 10 mg by mouth 3 (three) times daily.      meloxicam (MOBIC) 7.5 MG tablet " Take 7.5 mg by mouth once daily.      [DISCONTINUED] traMADoL (ULTRAM) 50 mg tablet Take 50 mg by mouth every 8 (eight) hours as needed.       No current facility-administered medications on file prior to visit.     Social History     Socioeconomic History    Marital status:    Tobacco Use    Smoking status: Never    Smokeless tobacco: Never   Substance and Sexual Activity    Alcohol use: Not Currently     Alcohol/week: 0.0 standard drinks of alcohol    Drug use: Never    Sexual activity: Yes     Partners: Female     Birth control/protection: None     Family History   Problem Relation Name Age of Onset    Coronary artery disease Maternal Grandfather      Breast cancer Paternal Grandmother      Diverticulosis Mother         Review of Systems   Constitutional:  Negative for activity change and unexpected weight change.   HENT:  Negative for hearing loss, rhinorrhea and trouble swallowing.    Eyes:  Negative for discharge and visual disturbance.   Respiratory:  Negative for chest tightness and wheezing.    Cardiovascular:  Negative for chest pain and palpitations.   Gastrointestinal:  Negative for blood in stool, constipation, diarrhea and vomiting.   Endocrine: Negative for polydipsia and polyuria.   Genitourinary:  Negative for difficulty urinating, hematuria and urgency.   Musculoskeletal:  Positive for back pain and neck pain. Negative for arthralgias and joint swelling.   Neurological:  Negative for weakness and headaches.   Psychiatric/Behavioral:  Negative for confusion and dysphoric mood.         Objective:     Vitals:    06/05/24 1505   BP: 122/78   Pulse: 74    Body mass index is 42.82 kg/m².  Physical Exam  Vitals and nursing note reviewed.   Constitutional:       General: He is not in acute distress.     Appearance: He is well-developed. He is not diaphoretic.   HENT:      Head: Normocephalic and atraumatic.      Right Ear: External ear normal.      Left Ear: External ear normal.      Nose: Nose  normal. No rhinorrhea.   Eyes:      Extraocular Movements: Extraocular movements intact.      Pupils: Pupils are equal, round, and reactive to light.   Neck:      Vascular: No carotid bruit.   Cardiovascular:      Rate and Rhythm: Normal rate.      Pulses: Normal pulses.      Heart sounds: No murmur heard.  Pulmonary:      Effort: Pulmonary effort is normal. No respiratory distress.      Breath sounds: Normal breath sounds.   Abdominal:      General: Bowel sounds are normal. There is no abdominal bruit.      Palpations: Abdomen is soft.   Musculoskeletal:         General: Tenderness present. Normal range of motion.      Cervical back: Normal range of motion and neck supple. Spasms and tenderness present. No bony tenderness or crepitus. No pain with movement.      Lumbar back: Spasms, tenderness and bony tenderness present. Negative right straight leg raise test and negative left straight leg raise test.   Skin:     General: Skin is warm and dry.      Capillary Refill: Capillary refill takes less than 2 seconds.      Findings: No rash.   Neurological:      General: No focal deficit present.      Mental Status: He is alert and oriented to person, place, and time.   Psychiatric:         Attention and Perception: He is attentive.         Mood and Affect: Mood normal. Mood is not anxious or depressed. Affect is not labile, blunt, angry or inappropriate.         Speech: He is communicative. Speech is not rapid and pressured, delayed, slurred or tangential.         Behavior: Behavior normal. Behavior is not agitated, slowed, aggressive, withdrawn, hyperactive or combative.         Thought Content: Thought content normal. Thought content is not paranoid or delusional. Thought content does not include homicidal or suicidal ideation. Thought content does not include homicidal or suicidal plan.         Cognition and Memory: Memory is not impaired.         Judgment: Judgment normal. Judgment is not impulsive or inappropriate.           No results found for any previous visit.    REASON FOR EXAM: [M54.2]-Cervicalgia     TECHNICAL FACTORS: Two or three views     COMPARISON: None     FINDINGS: There is no evidence of acute fracture. There is no evidence of subluxation. Vertebral body heights and disc spaces are maintained. Prevertebral soft tissue is within normal limits. The cervical spine maintains a normal lordotic curvature.     IMPRESSION:   No evidence of acute osseous abnormality.     Electronically signed by Lisette Huntley MD on 3/3/2022 8:56 AM    Indication: [M54.16]-Radiculopathy, lumbar region / [M47.27]-Other spondylosis with radiculopathy, lumbosacral region / Low back pain, symptoms persist with > 6wks conservative treatment / Low back pain, progressive neurologic deficit     Technique: Multiplanar T1 and T2 weighted images were obtained through the lumbar spine without administration of intravenous contrast.     Comparison: None     Findings:   Lumbar vertebral body heights and alignment are maintained. There is straightening of the normal lordosis. There is no suspicious marrow signal. The conus appears normal.     T12-L1: There is disc desiccation and mild facet DJD. There is no spinal stenosis. There is no foraminal narrowing.     L1-L2: There is disc desiccation and diffuse disc bulge without spinal stenosis. There is mild facet DJD without foraminal narrowing.     L2-L3: There is no spinal stenosis or foraminal narrowing. There is mild facet DJD.     L3-L4: There is disc desiccation and mild diffuse disc bulge without spinal stenosis. There is mild facet DJD without foraminal narrowing.     L4-L5: There is disc desiccation and diffuse disc bulge with a right subarticular/foraminal zone extrusion. This extends cranially along the posterior margin of the L4 vertebral body. This contacts traversing nerve roots in the right lateral recess.   There is no spinal stenosis. There is moderate facet DJD resulting in mild  bilateral foraminal narrowing.     L5-S1: There is disc desiccation and diffuse disc bulge with no spinal stenosis. There is moderate facet DJD with minimal/mild bilateral foraminal narrowing.     Impression:     1. Right subarticular/foraminal zone extrusion at L4-5 effacing nerve roots in the right lateral recess.   2. Mild lumbar degenerative changes elsewhere, with mild bilateral foraminal narrowing spanning L4-S1.           Electronically signed by Lyndon Shay MD on 1/17/2022 11:43 AM    Indication: [M54.50]-Low back pain, unspecified     3 views     Comparison: None     Impression:   Lumbar vertebral body heights are maintained. There is mild discogenic degenerative change as well as moderate SI joint DJD. There is moderate facet DJD, most severe at L4-5 and L5-S1.     Electronically signed by Lyndon Shay MD on 1/5/2022 11:50 AM  BMI Readings from Last 10 Encounters:   06/05/24 42.82 kg/m²   02/28/23 39.09 kg/m²   01/18/22 39.07 kg/m²       Assessment / Plan:    1.  Routine health exam-patient here for annual wellness exam.  Labs ordered.  Health maintenance was reviewed and ordered.  Anticipatory guidance: Don't smoke.  Healthy diet and regular exercise recommended. Vaccine recommendations discussed.  See orders.  Reviewed Anticipatory guidance, risk factor reduction interventions or counseling as needed, Complete history , physical was completed today.  Complete and thorough medication reconciliation was performed.  Discussed risks and benefits of medications.  Advised patient on orders and health maintenance.  We discussed old records and old labs if available.  Will request any records not available through epic.  Continue current medications listed on your summary sheet.  All questions were answered. Patient had no further concerns. Advised of diagnoses and plan. Follow up as planned or return sooner if symptoms persist or worsen.   Lumbar degenerative disc disease:  Continue with physical therapy  continue follow-up with pain management and continue current medication regimen.  Follow-up sooner if no improvement.  Reviewed imaging from [place].  Upper back pain/Neck spasm:  Referral to physical therapy.  Patient would benefit from dry needling.  Set up x-ray of the cervical spine if no improvement.  Discussed exercise and stretching for low back and upper back pain.  Assessment & Plan  1. Annual exam.  Blood work has been ordered.    Orders Placed This Encounter   Procedures    CBC Without Differential     Standing Status:   Future     Number of Occurrences:   1     Standing Expiration Date:   8/4/2025    Comprehensive Metabolic Panel     Standing Status:   Future     Number of Occurrences:   1     Standing Expiration Date:   8/4/2025    TSH     Standing Status:   Future     Number of Occurrences:   1     Standing Expiration Date:   8/4/2025    Hemoglobin A1C     Standing Status:   Future     Number of Occurrences:   1     Standing Expiration Date:   8/4/2025    Lipid Panel     Standing Status:   Future     Number of Occurrences:   1     Standing Expiration Date:   8/4/2025                Kahlil Velázquez MD

## 2024-06-05 NOTE — PATIENT INSTRUCTIONS
Christiano Marin,     If you are due for any health screening(s) below please notify me so we can arrange them to be ordered and scheduled. Most healthy patients at your age complete them, but you are free to accept or refuse.     If you can't do it, I'll definitely understand. If you can, I'd certainly appreciate it!    All of your core healthy metrics are met.

## 2024-09-11 ENCOUNTER — PATIENT MESSAGE (OUTPATIENT)
Dept: FAMILY MEDICINE | Facility: CLINIC | Age: 34
End: 2024-09-11

## 2024-12-12 ENCOUNTER — PATIENT MESSAGE (OUTPATIENT)
Dept: RESEARCH | Facility: HOSPITAL | Age: 34
End: 2024-12-12

## 2025-06-16 ENCOUNTER — TELEPHONE (OUTPATIENT)
Dept: FAMILY MEDICINE | Facility: CLINIC | Age: 35
End: 2025-06-16